# Patient Record
Sex: MALE | Race: WHITE | Employment: OTHER | URBAN - METROPOLITAN AREA
[De-identification: names, ages, dates, MRNs, and addresses within clinical notes are randomized per-mention and may not be internally consistent; named-entity substitution may affect disease eponyms.]

---

## 2018-10-17 ENCOUNTER — OFFICE VISIT (OUTPATIENT)
Dept: PRIMARY CARE CLINIC | Age: 63
End: 2018-10-17

## 2018-10-17 VITALS
WEIGHT: 194.4 LBS | TEMPERATURE: 98.2 F | SYSTOLIC BLOOD PRESSURE: 158 MMHG | OXYGEN SATURATION: 97 % | HEIGHT: 70 IN | DIASTOLIC BLOOD PRESSURE: 90 MMHG | RESPIRATION RATE: 16 BRPM | BODY MASS INDEX: 27.83 KG/M2 | HEART RATE: 68 BPM

## 2018-10-17 DIAGNOSIS — R52 GENERALIZED BODY ACHES: ICD-10-CM

## 2018-10-17 DIAGNOSIS — R03.0 ELEVATED BLOOD PRESSURE READING IN OFFICE WITHOUT DIAGNOSIS OF HYPERTENSION: ICD-10-CM

## 2018-10-17 DIAGNOSIS — R01.2 ABNORMAL HEART SOUNDS: ICD-10-CM

## 2018-10-17 DIAGNOSIS — Z76.89 ENCOUNTER TO ESTABLISH CARE: ICD-10-CM

## 2018-10-17 DIAGNOSIS — R09.82 POST-NASAL DRIP: Primary | ICD-10-CM

## 2018-10-17 PROBLEM — H26.9 CATARACT: Status: ACTIVE | Noted: 2018-10-17

## 2018-10-17 PROBLEM — H91.93 DIMINISHED HEARING, BILATERAL: Status: ACTIVE | Noted: 2018-10-17

## 2018-10-17 LAB
FLUAV+FLUBV AG NOSE QL IA.RAPID: NEGATIVE POS/NEG
FLUAV+FLUBV AG NOSE QL IA.RAPID: NEGATIVE POS/NEG
VALID INTERNAL CONTROL?: YES

## 2018-10-17 RX ORDER — FLUTICASONE PROPIONATE 50 MCG
2 SPRAY, SUSPENSION (ML) NASAL DAILY
Qty: 1 BOTTLE | Refills: 1 | Status: SHIPPED | OUTPATIENT
Start: 2018-10-17

## 2018-10-17 RX ORDER — IBUPROFEN 200 MG
TABLET ORAL
COMMUNITY

## 2018-10-17 NOTE — PROGRESS NOTES
HPI:     Chief Complaint   Patient presents with    [facility]    Cold Symptoms     x6 days, congestion, had chills last night,    Generalized Body Aches     in his joints    Ear Pain     mostly behind left ear        Patient is a 70 y.o. male with no significant medical history who presents as a new patient for acute visit for evaluation of cold symptoms. Reports that he has been experiencing some post nasal drainage for the past 2-3 days and has been needing to clear his throat more often. He also reports having chills last night, but no fever. Denies ear pain, but reports some left ear fullness. Denies any ear drainage. Wears bilateral hearing aids. Also complaining of generalized body aches, mostly of knees and shoulders for the past 5-6 days. Denies any injury or inciting events. Reports that Motrin helps alleviate soreness as well as walking/activity. Otherwise, patient denies any malaise, nasal congestion, rhinorrhea, vision change, headache, sinus pressure, sore throat, hoarseness, cough, dyspnea, wheezing, difficulty breathing, abdominal pain, change in appetite, nausea, vomiting, diarrhea. Denies any sick contacts. Patient wants to make sure he does not have any infection that could cause his 1year old grandchild to become sick. Patient's blood pressure is elevated today. He reports that blood pressure has been sporadically elevated in the past, but never consistently. Reports that blood pressure usually runs around 140/80. He denies any chest pain, palpitations, dyspnea, orthopnea, PND, peripheral edema, weight gain, dizziness, headache, blurred vision. Patient Active Problem List   Diagnosis Code    Cataract H26.9    Diminished hearing, bilateral H91.93     Current Outpatient Medications   Medication Sig Dispense Refill    ibuprofen (MOTRIN) 200 mg tablet Take  by mouth.  fluticasone (FLONASE) 50 mcg/actuation nasal spray 2 Sprays by Both Nostrils route daily.  1 Bottle 1     No Known Allergies     Past Medical History:   Diagnosis Date    Cataract     Hearing reduced, bilateral     wears bilateral hearing aids     Past Surgical History:   Procedure Laterality Date    HX BACK SURGERY      HX HERNIA REPAIR      HX KNEE ARTHROSCOPY       No family history on file. Social History     Tobacco Use    Smoking status: Former Smoker    Smokeless tobacco: Former User   Substance Use Topics    Alcohol use: Yes     Comment: occ        ROS:     Pertinent items are noted in HPI. Objective:     Vitals:    10/17/18 1007 10/17/18 1115   BP: (!) 173/94 158/90   Pulse: 68    Resp: 16    Temp: 98.2 °F (36.8 °C)    TempSrc: Oral    SpO2: 97%    Weight: 194 lb 6.4 oz (88.2 kg)    Height: 5' 10\" (1.778 m)         Vitals and Nurse Documentation reviewed. Physical Examination:   General appearance - alert, well appearing, and in no distress  Mental status - alert, oriented to person, place, and time, normal mood, behavior, speech, dress, motor activity, and thought processes  Eyes - pupils equal and reactive, extraocular eye movements intact, sclera white, conjunctiva pink  Ears - bilateral TM's and external ear canals normal, left ear canal with moderate amount of cerumen noted, TM normal.  No tenderness of mastoid bones.    Nose - normal and patent, no erythema, discharge or polyps  Mouth - mucous membranes moist, tonsils and pharynx without erythema or exudate  Neck - supple, no significant adenopathy  Chest - clear to auscultation, no wheezes, rales or rhonchi, symmetric air entry  Heart - normal rate, skipped beats noted, no murmurs appreciated   Abdomen - soft, nontender, nondistended, no masses or organomegaly  Neurological - alert, oriented, normal speech, no focal findings or movement disorder noted  Musculoskeletal - no joint tenderness, deformity or swelling  Extremities - peripheral pulses normal, no pedal edema, no clubbing or cyanosis      Assessment/ Plan:   Diagnoses and all orders for this visit:    Post-nasal drip  -     Post-nasal drainage for the past 3 days, otherwise asymptomatic   -     Could be allergy related  -     Discussed that there are no signs of bacterial infection that would warrant antibiotics at this time  -     Start fluticasone (FLONASE) 50 mcg/actuation nasal spray; 2 Sprays by Both Nostrils route daily. Medication benefits, risks, indication, dosage, potential adverse effects, and alternate medication options were discussed with patient who expressed understanding  -     Supportive care to include increased fluids, cool mist humidifier for any congestion    Generalized body aches  -     Denies any fevers, malaise, headaches, sore throat, vomiting, cough   -     AMB POC CHATO INFLUENZA A/B TEST is negative  -     Patient was offered flu vaccine today, but declined. He is going to wait to have vaccination at yearly physical.   -     Acetaminophen or ibuprofen as needed for discomfort     Elevated blood pressure reading in office without diagnosis of hypertension        -     Patient reports history of sporadic high blood pressure readings in the past, but no diagnosis of hypertension. He is asymptomatic.         -     Asked patient to measure and record BP at home and bring to next appointment        -     Discussed DASH diet, sodium reduction, exercise, and avoidance of caffeine    Abnormal heart sounds  -     Skipped beats noted   -     AMB POC EKG ROUTINE W/ 12 LEADS, INTER & REP shows sinus rhythm with few PACs  -     Patient is asymptomatic, will continue to monitor     Encounter to establish care       Follow-up Disposition:  Return in about 2 weeks (around 10/31/2018) for blood pressure follow-up, bring BP numbers. Advised to follow-up sooner if symptoms worsen. I have discussed the diagnosis with the patient and the intended plan as seen in the above orders.   Advised prompt follow-up if symptoms worsen or fail to improve and symptoms that would warrant emergent evaluation in ED. The patient has received an after-visit summary and questions were answered concerning future plans. I have discussed medication side effects and warnings with the patient as well. Patient expressed understanding and is in agreement with the diagnosis and plan.

## 2018-10-17 NOTE — PATIENT INSTRUCTIONS
DASH Diet: Care Instructions  Your Care Instructions    The DASH diet is an eating plan that can help lower your blood pressure. DASH stands for Dietary Approaches to Stop Hypertension. Hypertension is high blood pressure. The DASH diet focuses on eating foods that are high in calcium, potassium, and magnesium. These nutrients can lower blood pressure. The foods that are highest in these nutrients are fruits, vegetables, low-fat dairy products, nuts, seeds, and legumes. But taking calcium, potassium, and magnesium supplements instead of eating foods that are high in those nutrients does not have the same effect. The DASH diet also includes whole grains, fish, and poultry. The DASH diet is one of several lifestyle changes your doctor may recommend to lower your high blood pressure. Your doctor may also want you to decrease the amount of sodium in your diet. Lowering sodium while following the DASH diet can lower blood pressure even further than just the DASH diet alone. Follow-up care is a key part of your treatment and safety. Be sure to make and go to all appointments, and call your doctor if you are having problems. It's also a good idea to know your test results and keep a list of the medicines you take. How can you care for yourself at home? Following the DASH diet  · Eat 4 to 5 servings of fruit each day. A serving is 1 medium-sized piece of fruit, ½ cup chopped or canned fruit, 1/4 cup dried fruit, or 4 ounces (½ cup) of fruit juice. Choose fruit more often than fruit juice. · Eat 4 to 5 servings of vegetables each day. A serving is 1 cup of lettuce or raw leafy vegetables, ½ cup of chopped or cooked vegetables, or 4 ounces (½ cup) of vegetable juice. Choose vegetables more often than vegetable juice. · Get 2 to 3 servings of low-fat and fat-free dairy each day. A serving is 8 ounces of milk, 1 cup of yogurt, or 1 ½ ounces of cheese. · Eat 6 to 8 servings of grains each day.  A serving is 1 slice of bread, 1 ounce of dry cereal, or ½ cup of cooked rice, pasta, or cooked cereal. Try to choose whole-grain products as much as possible. · Limit lean meat, poultry, and fish to 2 servings each day. A serving is 3 ounces, about the size of a deck of cards. · Eat 4 to 5 servings of nuts, seeds, and legumes (cooked dried beans, lentils, and split peas) each week. A serving is 1/3 cup of nuts, 2 tablespoons of seeds, or ½ cup of cooked beans or peas. · Limit fats and oils to 2 to 3 servings each day. A serving is 1 teaspoon of vegetable oil or 2 tablespoons of salad dressing. · Limit sweets and added sugars to 5 servings or less a week. A serving is 1 tablespoon jelly or jam, ½ cup sorbet, or 1 cup of lemonade. · Eat less than 2,300 milligrams (mg) of sodium a day. If you limit your sodium to 1,500 mg a day, you can lower your blood pressure even more. Tips for success  · Start small. Do not try to make dramatic changes to your diet all at once. You might feel that you are missing out on your favorite foods and then be more likely to not follow the plan. Make small changes, and stick with them. Once those changes become habit, add a few more changes. · Try some of the following:  ? Make it a goal to eat a fruit or vegetable at every meal and at snacks. This will make it easy to get the recommended amount of fruits and vegetables each day. ? Try yogurt topped with fruit and nuts for a snack or healthy dessert. ? Add lettuce, tomato, cucumber, and onion to sandwiches. ? Combine a ready-made pizza crust with low-fat mozzarella cheese and lots of vegetable toppings. Try using tomatoes, squash, spinach, broccoli, carrots, cauliflower, and onions. ? Have a variety of cut-up vegetables with a low-fat dip as an appetizer instead of chips and dip. ? Sprinkle sunflower seeds or chopped almonds over salads. Or try adding chopped walnuts or almonds to cooked vegetables.   ? Try some vegetarian meals using beans and peas. Add garbanzo or kidney beans to salads. Make burritos and tacos with mashed jean baptiste beans or black beans. Where can you learn more? Go to http://kwesi-shahab.info/. Enter Y863 in the search box to learn more about \"DASH Diet: Care Instructions. \"  Current as of: December 6, 2017  Content Version: 11.8  © 0021-7804 [employer]. Care instructions adapted under license by [employer] (which disclaims liability or warranty for this information). If you have questions about a medical condition or this instruction, always ask your healthcare professional. [address] any warranty or liability for your use of this information.

## 2020-07-24 ENCOUNTER — HOSPITAL ENCOUNTER (OUTPATIENT)
Dept: MRI IMAGING | Age: 65
Discharge: HOME | End: 2020-07-24
Payer: MEDICARE

## 2020-07-24 DIAGNOSIS — M25.512 CHRONIC LEFT SHOULDER PAIN: ICD-10-CM

## 2020-07-24 DIAGNOSIS — M75.42 SHOULDER IMPINGEMENT, LEFT: ICD-10-CM

## 2020-07-24 DIAGNOSIS — M19.012 ARTHRITIS OF LEFT ACROMIOCLAVICULAR JOINT: ICD-10-CM

## 2020-07-24 DIAGNOSIS — G89.29 CHRONIC LEFT SHOULDER PAIN: ICD-10-CM

## 2020-07-24 DIAGNOSIS — S46.012A TRAUMATIC TEAR OF LEFT ROTATOR CUFF, INITIAL ENCOUNTER: ICD-10-CM

## 2020-07-24 PROCEDURE — 73221 MRI JOINT UPR EXTREM W/O DYE: CPT | Performed by: ORTHOPAEDIC SURGERY

## 2022-01-27 ENCOUNTER — OFFICE VISIT (OUTPATIENT)
Dept: ORTHOPEDIC SURGERY | Age: 67
End: 2022-01-27
Payer: MEDICARE

## 2022-01-27 VITALS — BODY MASS INDEX: 28 KG/M2 | WEIGHT: 200 LBS | HEIGHT: 71 IN

## 2022-01-27 DIAGNOSIS — M25.676 JOINT STIFFNESS OF TOE, UNSPECIFIED LATERALITY: ICD-10-CM

## 2022-01-27 DIAGNOSIS — M79.671 RIGHT FOOT PAIN: ICD-10-CM

## 2022-01-27 DIAGNOSIS — M77.41 METATARSALGIA OF RIGHT FOOT: Primary | ICD-10-CM

## 2022-01-27 PROCEDURE — G8536 NO DOC ELDER MAL SCRN: HCPCS | Performed by: ORTHOPAEDIC SURGERY

## 2022-01-27 PROCEDURE — G8427 DOCREV CUR MEDS BY ELIG CLIN: HCPCS | Performed by: ORTHOPAEDIC SURGERY

## 2022-01-27 PROCEDURE — 1101F PT FALLS ASSESS-DOCD LE1/YR: CPT | Performed by: ORTHOPAEDIC SURGERY

## 2022-01-27 PROCEDURE — G8432 DEP SCR NOT DOC, RNG: HCPCS | Performed by: ORTHOPAEDIC SURGERY

## 2022-01-27 PROCEDURE — 3017F COLORECTAL CA SCREEN DOC REV: CPT | Performed by: ORTHOPAEDIC SURGERY

## 2022-01-27 PROCEDURE — G8419 CALC BMI OUT NRM PARAM NOF/U: HCPCS | Performed by: ORTHOPAEDIC SURGERY

## 2022-01-27 PROCEDURE — 99213 OFFICE O/P EST LOW 20 MIN: CPT | Performed by: ORTHOPAEDIC SURGERY

## 2022-01-27 NOTE — PROGRESS NOTES
Yovani Anne (: 1955) is a 74 y.o. male, patient,here for evaluation of the following   Chief Complaint   Patient presents with    Foot Pain     right foot pain has been around for some time, has been at the ball of his foot x 2 years after resting it would go away; it feels like a sock is folded up under his foot mostly at the 2nd and 3rd toes, wakes up in the night feeling pain, and there is pain         ASSESSMENT/PLAN:  Below is the assessment and plan developed based on review of pertinent history, physical exam, labs, studies, and medications. 1. Metatarsalgia of right foot  -     REFERRAL TO PHYSICAL THERAPY  2. Joint stiffness of toe, unspecified laterality  -     REFERRAL TO PHYSICAL THERAPY  3. Right foot pain  -     REFERRAL TO PHYSICAL THERAPY    Patient has forefoot pain, metatarsalgia without Díaz's neuroma and movement of tenosynovitis or irritation to the MTP joint probably due to overload of the joint. Patient has some stiffness to the toes, recommended intrinsic muscle strengthening program and toe yoga, provided exercises to do. I also recommended physical therapy for the same, I think patient will benefit from this treatment as he is overall very tight at the lower extremity muscles and tendons. Provided information on types of shoes to wear and also insoles that have metatarsal padding. Provided information about modalities  to use along with the shoe changes. If symptoms persistent at 2 months from now. I suspect shoewear modification will be helpful in treatment of this problem. No surgical indications. Return in about 2 months (around 3/27/2022). No Known Allergies    Current Outpatient Medications   Medication Sig    ibuprofen (MOTRIN) 200 mg tablet Take  by mouth.  fluticasone (FLONASE) 50 mcg/actuation nasal spray 2 Sprays by Both Nostrils route daily.     HYDROcodone-acetaminophen (NORCO) 5-325 mg per tablet Take 1-2 Tabs by mouth every six (6) hours as needed for Pain. No current facility-administered medications for this visit. Past Medical History:   Diagnosis Date    Astigmatism of both eyes     Cataract     Chronic pain     left knee    Hearing reduced, bilateral     wears bilateral hearing aids       Past Surgical History:   Procedure Laterality Date    HX BACK SURGERY      HX CYST REMOVAL Left     hand    HX HERNIA REPAIR Bilateral     inguinal    HX HERNIA REPAIR      HX KNEE ARTHROSCOPY      HX LUMBAR FUSION      L4, L5, implanted a stimulator device    HX ORTHOPAEDIC Right 1973    cartilage removal in knee        Family History   Problem Relation Age of Onset    Hypertension Mother    Acevedo OSTEOARTHRITIS Mother     Other Mother         murmur    Alcohol abuse Father     Anesth Problems Neg Hx     Diabetes Brother     Diabetes Brother        Social History     Socioeconomic History    Marital status:      Spouse name: Not on file    Number of children: Not on file    Years of education: Not on file    Highest education level: Not on file   Occupational History    Not on file   Tobacco Use    Smoking status: Former Smoker     Packs/day: 1.00     Years: 35.00     Pack years: 35.00     Quit date: 3/21/2003     Years since quittin.8    Smokeless tobacco: Former User   Substance and Sexual Activity    Alcohol use: Yes     Comment: occ    Drug use: No    Sexual activity: Not on file   Other Topics Concern    Not on file   Social History Narrative    ** Merged History Encounter **          Social Determinants of Health     Financial Resource Strain:     Difficulty of Paying Living Expenses: Not on file   Food Insecurity:     Worried About Running Out of Food in the Last Year: Not on file    Deborah of Food in the Last Year: Not on file   Transportation Needs:     Lack of Transportation (Medical): Not on file    Lack of Transportation (Non-Medical):  Not on file   Physical Activity:     Days of Exercise per Week: Not on file    Minutes of Exercise per Session: Not on file   Stress:     Feeling of Stress : Not on file   Social Connections:     Frequency of Communication with Friends and Family: Not on file    Frequency of Social Gatherings with Friends and Family: Not on file    Attends Faith Services: Not on file    Active Member of [address] [place] [address] [employer] or Organizations: Not on file    Attends Club or Organization Meetings: Not on file    Marital Status: Not on file   Intimate Partner Violence:     Fear of Current or Ex-Partner: Not on file    Emotionally Abused: Not on file    Physically Abused: Not on file    Sexually Abused: Not on file   Housing Stability:     Unable to Pay for Housing in the Last Year: Not on file    Number of [place] in the Last Year: Not on file    Unstable Housing in the Last Year: Not on file           Vitals:  Ht 5' 10.5\" (1.791 m)   Wt 200 lb (90.7 kg)   BMI 28.29 kg/m²    Body mass index is 28.29 kg/m². ROS     Positive for: Musculoskeletal (right foot )    Negative for: Constitutional, Gastrointestinal, Neurological, Skin, Genitourinary, HENT, Endocrine, Cardiovascular, Eyes, Respiratory, Psychiatric, Allergic/Imm, Heme/Lymph    Last edited by Celina Hill on 2022  7:48 AM. (History)              SUBJECTIVE/OBJECTIVE:  Clary Cleveland (: 1955)   New patient presents today with complaint of right foot pain and states pain is worse all day long. The pain is focused to the right foot. At the second MTP joint area, this problem ongoing for years and not getting better. Gradually it seems to be getting worse over time. The pain is severe sharp stabbing pain goes associated with swelling and tingling sensations. Walking makes it worse. He has tried rest and local cream application to help with the pain is able to help. He does have some underlying diabetes mellitus with an A1c of 7.4. He has underlying heart disease, hypertension. Non-smoker. Physical Exam  Pleasant well-nourished male , alert and oriented to person, time and place, no acute distress. Mild antalgic gait, satisfactory weightbearing stance. Right ankle: Full active and passive range of motion intact, nontender, no swelling, ligaments grossly stable. There are some tightness to dorsiflexion on attempted dorsiflexion when knee is extended. Right foot: There is no severe malalignment or deformity to the foot, there is tenderness at the second MTP joint and metatarsal head on the plantar aspect is also tender. The webspace second, third and fourth nontender. Negative Arie's test.  There is limited range of motion to toes, stiffness of the toes noted, mild hammertoe deformities. ,    Contralateral foot and ankle exam, nontender, no swelling ligaments grossly stable. Normal weightbearing stance. Neurovascular exam intact for light touch sensation, cap refill, dorsalis pedis pulse palpable, flexion/extension strength 5/5. Skin intact without open wounds, lesions or ulcers, no skin discolorations, normal warmth to skin. Imaging:    XR Results (most recent):  Results from Appointment encounter on 01/27/22    XR FOOT RT MIN 3 V    Narrative  Right foot AP, lateral and oblique x-rays show no acute fractures or dislocation. There is mild hallux interphalangeus, midfoot arthritis, second and third and fourth metatarsals are longer than the first ray, no acute abnormalities. Forefoot joints are satisfactory without arthritis. On lateral view there is a bone spur at the midfoot tarsometatarsal joints. An electronic signature was used to authenticate this note.   -- Lani Yoder MD

## 2022-03-30 ENCOUNTER — OFFICE VISIT (OUTPATIENT)
Dept: ORTHOPEDIC SURGERY | Age: 67
End: 2022-03-30
Payer: MEDICARE

## 2022-03-30 VITALS — HEIGHT: 70 IN | BODY MASS INDEX: 29.06 KG/M2 | WEIGHT: 203 LBS

## 2022-03-30 DIAGNOSIS — M25.674 JOINT STIFFNESS OF TOE, RIGHT: ICD-10-CM

## 2022-03-30 DIAGNOSIS — M20.61 ACQUIRED TOE DEFORMITY, RIGHT: ICD-10-CM

## 2022-03-30 DIAGNOSIS — M77.41 METATARSALGIA, RIGHT FOOT: Primary | ICD-10-CM

## 2022-03-30 PROCEDURE — 99213 OFFICE O/P EST LOW 20 MIN: CPT | Performed by: ORTHOPAEDIC SURGERY

## 2022-03-30 PROCEDURE — G8432 DEP SCR NOT DOC, RNG: HCPCS | Performed by: ORTHOPAEDIC SURGERY

## 2022-03-30 PROCEDURE — G8419 CALC BMI OUT NRM PARAM NOF/U: HCPCS | Performed by: ORTHOPAEDIC SURGERY

## 2022-03-30 PROCEDURE — 3017F COLORECTAL CA SCREEN DOC REV: CPT | Performed by: ORTHOPAEDIC SURGERY

## 2022-03-30 PROCEDURE — 1101F PT FALLS ASSESS-DOCD LE1/YR: CPT | Performed by: ORTHOPAEDIC SURGERY

## 2022-03-30 PROCEDURE — G8427 DOCREV CUR MEDS BY ELIG CLIN: HCPCS | Performed by: ORTHOPAEDIC SURGERY

## 2022-03-30 PROCEDURE — G8536 NO DOC ELDER MAL SCRN: HCPCS | Performed by: ORTHOPAEDIC SURGERY

## 2022-03-30 RX ORDER — LOSARTAN POTASSIUM AND HYDROCHLOROTHIAZIDE 12.5; 1 MG/1; MG/1
1 TABLET ORAL DAILY
COMMUNITY
Start: 2022-02-07

## 2022-03-30 RX ORDER — METFORMIN HYDROCHLORIDE 500 MG/1
1000 TABLET ORAL DAILY
COMMUNITY
Start: 2022-01-19

## 2022-03-30 RX ORDER — ASPIRIN 81 MG/1
TABLET ORAL
COMMUNITY

## 2022-03-30 RX ORDER — METOPROLOL SUCCINATE 25 MG/1
25 TABLET, EXTENDED RELEASE ORAL DAILY
COMMUNITY
Start: 2022-01-10

## 2022-03-30 RX ORDER — SITAGLIPTIN 50 MG/1
50 TABLET, FILM COATED ORAL DAILY
COMMUNITY
Start: 2022-03-09

## 2022-03-30 NOTE — LETTER
4/4/2022    Patient: Jareth Hernandez   YOB: 1955   Date of Visit: 3/30/2022     Jim Lindsay MD  [address] [place] [phone]  Via Fax: [phone]    Dear Jim Lindsay MD,      Thank you for referring Mr. Yadira Valdivia to [facility] for evaluation. My notes for this consultation are attached. If you have questions, please do not hesitate to call me. I look forward to following your patient along with you.       Sincerely,    Jona Kemp MD

## 2022-03-30 NOTE — PROGRESS NOTES
Suad Augustine (: 1955) is a 74 y.o. male, patient,here for evaluation of the following   Chief Complaint   Patient presents with    Foot Pain     Metatarsalgia of right foot follow up        ASSESSMENT/PLAN:  Below is the assessment and plan developed based on review of pertinent history, physical exam, labs, studies, and medications. 1. Metatarsalgia, right foot  2. Joint stiffness of toe, right  3. Acquired toe deformity, right    Patient back for reevaluation of the right foot, last time seen he was inform of appropriate shoe wear and insoles which she has tried and he states it has helped a lot the pain at the forefoot but the second and first toe are still impinging causing discomfort towards the tip of toe. So discussed alternative treatments including toe sleeves and toe spacers to see if that alleviates the problem, the problem does not resolve, surgery may be indicated to correct deformity so they are not impinging causing pain. Lengthy discussion had about this. Patient will maximize conservative treatment and return if any worsening problems to consider possibly surgical treatment. Next time he returns we will get an x-ray right foot 3 views weightbearing. Return if symptoms worsen or fail to improve. Allergies   Allergen Reactions    Guaifenesin Unknown (comments)       Current Outpatient Medications   Medication Sig    metFORMIN (GLUCOPHAGE) 500 mg tablet Take 1,000 mg by mouth daily.  losartan-hydroCHLOROthiazide (HYZAAR) 100-12.5 mg per tablet Take 1 Tablet by mouth daily.  aspirin delayed-release 81 mg tablet 1 tablet    metoprolol succinate (TOPROL-XL) 25 mg XL tablet Take 25 mg by mouth daily.  Januvia 50 mg tablet Take 50 mg by mouth daily.  fluticasone (FLONASE) 50 mcg/actuation nasal spray 2 Sprays by Both Nostrils route daily.  ibuprofen (MOTRIN) 200 mg tablet Take  by mouth.  (Patient not taking: Reported on 3/30/2022)    HYDROcodone-acetaminophen (NORCO) 5-325 mg per tablet Take 1-2 Tabs by mouth every six (6) hours as needed for Pain. (Patient not taking: Reported on 3/30/2022)     No current facility-administered medications for this visit.        Past Medical History:   Diagnosis Date    Astigmatism of both eyes     Cataract     Chronic pain     left knee    Hearing reduced, bilateral     wears bilateral hearing aids       Past Surgical History:   Procedure Laterality Date    HX BACK SURGERY      HX CYST REMOVAL Left     hand    HX HERNIA REPAIR Bilateral     inguinal    HX HERNIA REPAIR      HX KNEE ARTHROSCOPY      HX LUMBAR FUSION      L4, L5, implanted a stimulator device    HX ORTHOPAEDIC Right 1973    cartilage removal in knee        Family History   Problem Relation Age of Onset    Hypertension Mother    Ki Lobaguilar OSTEOARTHRITIS Mother     Other Mother         murmur    Alcohol abuse Father     Anesth Problems Neg Hx     Diabetes Brother     Diabetes Brother        Social History     Socioeconomic History    Marital status:      Spouse name: Not on file    Number of children: Not on file    Years of education: Not on file    Highest education level: Not on file   Occupational History    Not on file   Tobacco Use    Smoking status: Former Smoker     Packs/day: 1.00     Years: 35.00     Pack years: 35.00     Quit date: 3/21/2003     Years since quittin.0    Smokeless tobacco: Former User   Substance and Sexual Activity    Alcohol use: Yes     Comment: occ    Drug use: No    Sexual activity: Not on file   Other Topics Concern    Not on file   Social History Narrative    ** Merged History Encounter **          Social Determinants of Health     Financial Resource Strain:     Difficulty of Paying Living Expenses: Not on file   Food Insecurity:     Worried About Running Out of Food in the Last Year: Not on file    Deborah of Food in the Last Year: Not on file   Transportation Needs:     Lack of Transportation (Medical): Not on file    Lack of Transportation (Non-Medical): Not on file   Physical Activity:     Days of Exercise per Week: Not on file    Minutes of Exercise per Session: Not on file   Stress:     Feeling of Stress : Not on file   Social Connections:     Frequency of Communication with Friends and Family: Not on file    Frequency of Social Gatherings with Friends and Family: Not on file    Attends Nondenominational Services: Not on file    Active Member of [address] [place] [address] [employer] or Organizations: Not on file    Attends Club or Organization Meetings: Not on file    Marital Status: Not on file   Intimate Partner Violence:     Fear of Current or Ex-Partner: Not on file    Emotionally Abused: Not on file    Physically Abused: Not on file    Sexually Abused: Not on file   Housing Stability:     Unable to Pay for Housing in the Last Year: Not on file    Number of [place] in the Last Year: Not on file    Unstable Housing in the Last Year: Not on file           Vitals:  Ht 5' 10\" (1.778 m)   Wt 203 lb (92.1 kg)   BMI 29.13 kg/m²    Body mass index is 29.13 kg/m². SUBJECTIVE/OBJECTIVE:  Beverley Escobar (: 1955)   Patient is back for reevaluation of right foot, last time seen we had discussed shoewear modification, stretching program as demonstrated today in clinic. He is experiencing some problems with the second toe little bit of acquired deformity to the first and second toe that is causing some problems. He states that shoes not really helping him at this point, he still has some pain towards the end of the tip of toe. Physical Exam  Pleasant well-nourished male , alert and oriented to person, time and place, no acute distress. Mild antalgic gait, normal weightbearing stance. Right lower extremity/ankle: Nontender, no swelling, ligaments grossly stable.     Right foot: Hallux interphalangeus impinging the adjacent second toe, there is slight medial deviation of the second toe which creates little bit of tenderness towards the tip where the 2 toes impinge. No swelling, therefore it is minimally tender today. Contralateral foot and ankle exam, nontender, no swelling ligaments grossly stable. Normal weightbearing stance. Neurovascular exam intact for light touch sensation, cap refill, dorsalis pedis pulse palpable, flexion/extension strength 5/5. Skin intact without open wounds, lesions or ulcers, no skin discolorations, normal warmth to skin. Imaging:    No x-rays obtained today. An electronic signature was used to authenticate this note.   -- Lizzy Alonzo MD

## 2024-07-26 NOTE — PROGRESS NOTES
Hospitalist Progress Note      Patient:  Obi Suarez Sr. 72 y.o. male     : 1951  Unit/Bed:[address] [place] [address]  Date of Admission: 2024        ASSESSMENT AND PLAN    Active Problems  Chronic anemia with questionable acute exacerbation  Longstanding history of anemia dating back to .  Suspect due to poor diet and alcohol use  Outpatient hemoglobin level of 6.8 on .  On arrival  hemoglobin was 7.1.  Status post 1 unit PRBC in ED  Posttransfusion hemoglobin stable in eights.  Resume Xarelto today monitor for 24 hours  FOBT negative.  Urinalysis negative for blood.  Continue Venofer x 3.  Start herself on discharge  Referral to outpatient colonoscopy-last was  with polyps.  Consider hematology referral  ROBSON on CKD stage III -improved  Creatinine 1.6 on arrival and has improved 1.4   UA unremarkable.  Hold nephrotoxic agents including ACE inhibitor  BMP tomorrow a.m.  Alcohol abuse  CIWA protocol canceled as reportedly drinks 2 large beers per day.  No history of withdrawal.  Monitor for signs and symptoms  Continue thiamine  Paroxysmal atrial fibrillation  [id number].  Resume Xarelto today as hemoglobin stable    Resolved Problems  Chronic Conditions (reviewed and stable unless otherwise stated)  Hypertension  Hyperlipidemia  GERD      LDA: []CVC / []PICC / []Midline / []Max / []Drains / []Mediport / [x]None  Antibiotics: None  Steroids: None  Labs (still needed?): [x]Yes / []No  IVF (still needed?): []Yes / [x]No    Level of care: []Step Down / [x]Med-Surg  Bed Status: [x]Inpatient / []Observation  Telemetry: [x]Yes / []No  PT/OT: []Yes / [x]No    DVT Prophylaxis: [] Lovenox / [] Heparin / [] SCDs / [x] Already on Systemic Anticoagulation / [] None     Expected discharge date:  tomorrow   Disposition: home      Code status: Full Code     ===================================================================    Chief Complaint: abnormal hemoglobin levels     Subjective (past 24 hours):  1. Have you been to the ER, urgent care clinic since your last visit? Hospitalized since your last visit? No    2. Have you seen or consulted any other health care providers outside of the [address] [place] [address] since your last visit? Include any pap smears or colon screening. No [facility].      Chief Complaint   Patient presents with    Establish Care    Cold Symptoms     x6 days, congestion, had chills last night,    Generalized Body Aches     in his joints    Ear Pain     mostly behind left ear